# Patient Record
Sex: FEMALE | Race: WHITE | NOT HISPANIC OR LATINO | Employment: FULL TIME | URBAN - METROPOLITAN AREA
[De-identification: names, ages, dates, MRNs, and addresses within clinical notes are randomized per-mention and may not be internally consistent; named-entity substitution may affect disease eponyms.]

---

## 2018-02-15 ENCOUNTER — SURGERY - HEALTHEAST (OUTPATIENT)
Dept: SURGERY | Facility: HOSPITAL | Age: 43
End: 2018-02-15

## 2018-02-15 ENCOUNTER — AMBULATORY - HEALTHEAST (OUTPATIENT)
Dept: SURGERY | Facility: CLINIC | Age: 43
End: 2018-02-15

## 2018-02-15 ENCOUNTER — ANESTHESIA - HEALTHEAST (OUTPATIENT)
Dept: SURGERY | Facility: HOSPITAL | Age: 43
End: 2018-02-15

## 2018-02-15 ASSESSMENT — MIFFLIN-ST. JEOR: SCORE: 1449.88

## 2018-02-16 ASSESSMENT — MIFFLIN-ST. JEOR: SCORE: 1407.69

## 2018-02-22 ENCOUNTER — OFFICE VISIT - HEALTHEAST (OUTPATIENT)
Dept: SURGERY | Facility: CLINIC | Age: 43
End: 2018-02-22

## 2018-02-22 DIAGNOSIS — Z98.890 POST-OPERATIVE STATE: ICD-10-CM

## 2018-02-22 DIAGNOSIS — Z90.49 S/P APPENDECTOMY: ICD-10-CM

## 2018-02-28 ENCOUNTER — COMMUNICATION - HEALTHEAST (OUTPATIENT)
Dept: SURGERY | Facility: CLINIC | Age: 43
End: 2018-02-28

## 2018-03-01 ENCOUNTER — AMBULATORY - HEALTHEAST (OUTPATIENT)
Dept: SURGERY | Facility: CLINIC | Age: 43
End: 2018-03-01

## 2018-03-01 DIAGNOSIS — Z90.49 S/P APPENDECTOMY: ICD-10-CM

## 2018-03-01 RX ORDER — HYDROCODONE BITARTRATE AND ACETAMINOPHEN 5; 325 MG/1; MG/1
1 TABLET ORAL EVERY 4 HOURS PRN
Qty: 10 TABLET | Refills: 0 | Status: SHIPPED | OUTPATIENT
Start: 2018-03-01 | End: 2023-09-19

## 2021-05-25 ENCOUNTER — RECORDS - HEALTHEAST (OUTPATIENT)
Dept: ADMINISTRATIVE | Facility: CLINIC | Age: 46
End: 2021-05-25

## 2021-06-01 VITALS — HEIGHT: 69 IN | WEIGHT: 155.7 LBS | BODY MASS INDEX: 23.06 KG/M2

## 2021-06-16 PROBLEM — K37 APPENDICITIS: Status: ACTIVE | Noted: 2018-02-15

## 2021-06-16 NOTE — ANESTHESIA POSTPROCEDURE EVALUATION
Patient: Ericka Hewitt  APPENDECTOMY, LAPAROSCOPIC  Anesthesia type: general    Patient location: PACU  Last vitals:   Vitals:    02/15/18 1900   BP: 147/69   Pulse: 80   Resp: 28   Temp: 37.2  C (99  F)   SpO2: 100%     Post vital signs: stable  Level of consciousness: awake and responds to simple questions  Post-anesthesia pain: pain controlled  Post-anesthesia nausea and vomiting: no  Pulmonary: unassisted, return to baseline  Cardiovascular: stable and blood pressure at baseline  Hydration: adequate  Anesthetic events: no    QCDR Measures:  ASA# 11 - Enma-op Cardiac Arrest: ASA11B - Patient did NOT experience unanticipated cardiac arrest  ASA# 12 - Enma-op Mortality Rate: ASA12B - Patient did NOT die  ASA# 13 - PACU Re-Intubation Rate: ASA13B - Patient did NOT require a new airway mgmt  ASA# 10 - Composite Anes Safety: ASA10A - No serious adverse event       Additional Notes:

## 2021-06-16 NOTE — PROGRESS NOTES
HPI: Pt is here for follow up of a lap appy.   she is doing well.  Pain is well controlled during the day but she finds herself waking up at night around 3 am due to pain and has difficulty falling back asleep.  No difficulties with the surgical wound/wounds.  she is eating well and denies fever and chills.         /58 (Patient Site: Right Arm, Patient Position: Sitting, Cuff Size: Adult Regular)  Pulse 81  LMP 02/17/2018  SpO2 98%  Breastfeeding? No    EXAM:  GENERAL:Appears well  ABDOMEN:  Soft, +BS  SURGICAL WOUNDS:  Incisions healing well, no enduration or drainage.        Assessment/Plan: .   Refill of 20 tabs of vicodin given to help with pain overnight, tylenol/ibuprofen during the day.    Doing well after surgery and should follow up as needed.      Car Howard, [facility] Department of Surgery

## 2021-06-16 NOTE — ANESTHESIA CARE TRANSFER NOTE
Last vitals:   Vitals:    02/15/18 1840   BP: 165/81   Pulse: 85   Resp: 18   Temp: 36.7  C (98.1  F)   SpO2: 100%     Patient's level of consciousness is drowsy  Spontaneous respirations: yes  Maintains airway independently: yes  Dentition unchanged: yes  Oropharynx: oropharynx clear of all foreign objects    QCDR Measures:  ASA# 20 - Surgical Safety Checklist: WHO surgical safety checklist completed prior to induction  PQRS# 430 - Adult PONV Prevention: 4558F - Pt received => 2 anti-emetic agents (different classes) preop & intraop  ASA# 8 - Peds PONV Prevention: NA - Not pediatric patient, not GA or 2 or more risk factors NOT present  PQRS# 424 - Enma-op Temp Management: 4559F - At least one body temp DOCUMENTED => 35.5C or 95.9F within required timeframe  PQRS# 426 - PACU Transfer Protocol: - Transfer of care checklist used  ASA# 14 - Acute Post-op Pain: ASA14B - Patient did NOT experience pain >= 7 out of 10

## 2021-08-15 ENCOUNTER — HEALTH MAINTENANCE LETTER (OUTPATIENT)
Age: 46
End: 2021-08-15

## 2021-10-11 ENCOUNTER — HEALTH MAINTENANCE LETTER (OUTPATIENT)
Age: 46
End: 2021-10-11

## 2022-09-25 ENCOUNTER — HEALTH MAINTENANCE LETTER (OUTPATIENT)
Age: 47
End: 2022-09-25

## 2023-07-17 ENCOUNTER — APPOINTMENT (OUTPATIENT)
Dept: RADIOLOGY | Facility: HOSPITAL | Age: 48
End: 2023-07-17
Attending: FAMILY MEDICINE
Payer: COMMERCIAL

## 2023-07-17 ENCOUNTER — HOSPITAL ENCOUNTER (EMERGENCY)
Facility: HOSPITAL | Age: 48
Discharge: HOME | End: 2023-07-17
Attending: EMERGENCY MEDICINE | Admitting: EMERGENCY MEDICINE
Payer: COMMERCIAL

## 2023-07-17 VITALS
HEIGHT: 69 IN | OXYGEN SATURATION: 100 % | DIASTOLIC BLOOD PRESSURE: 93 MMHG | HEART RATE: 87 BPM | WEIGHT: 155 LBS | TEMPERATURE: 98.6 F | BODY MASS INDEX: 22.96 KG/M2 | SYSTOLIC BLOOD PRESSURE: 186 MMHG | RESPIRATION RATE: 20 BRPM

## 2023-07-17 DIAGNOSIS — J20.9 ACUTE BRONCHITIS, UNSPECIFIED ORGANISM: ICD-10-CM

## 2023-07-17 LAB
ANION GAP SERPL CALCULATED.3IONS-SCNC: 14 MMOL/L (ref 7–15)
BASE EXCESS BLDV CALC-SCNC: 5.8 MMOL/L
BASOPHILS # BLD AUTO: 0.1 10E3/UL (ref 0–0.2)
BASOPHILS NFR BLD AUTO: 1 %
BUN SERPL-MCNC: 6.4 MG/DL (ref 6–20)
CALCIUM SERPL-MCNC: 9.5 MG/DL (ref 8.6–10)
CHLORIDE SERPL-SCNC: 98 MMOL/L (ref 98–107)
CREAT SERPL-MCNC: 0.64 MG/DL (ref 0.51–0.95)
D DIMER PPP FEU-MCNC: 0.36 UG/ML FEU (ref 0–0.5)
DEPRECATED HCO3 PLAS-SCNC: 27 MMOL/L (ref 22–29)
EOSINOPHIL # BLD AUTO: 0.2 10E3/UL (ref 0–0.7)
EOSINOPHIL NFR BLD AUTO: 2 %
ERYTHROCYTE [DISTWIDTH] IN BLOOD BY AUTOMATED COUNT: 12.8 % (ref 10–15)
GFR SERPL CREATININE-BSD FRML MDRD: >90 ML/MIN/1.73M2
GLUCOSE SERPL-MCNC: 97 MG/DL (ref 70–99)
HCO3 BLDV-SCNC: 30 MMOL/L (ref 24–30)
HCT VFR BLD AUTO: 36.9 % (ref 35–47)
HGB BLD-MCNC: 12.2 G/DL (ref 11.7–15.7)
IMM GRANULOCYTES # BLD: 0.1 10E3/UL
IMM GRANULOCYTES NFR BLD: 1 %
LYMPHOCYTES # BLD AUTO: 5.2 10E3/UL (ref 0.8–5.3)
LYMPHOCYTES NFR BLD AUTO: 47 %
MAGNESIUM SERPL-MCNC: 2.1 MG/DL (ref 1.7–2.3)
MCH RBC QN AUTO: 30 PG (ref 26.5–33)
MCHC RBC AUTO-ENTMCNC: 33.1 G/DL (ref 31.5–36.5)
MCV RBC AUTO: 91 FL (ref 78–100)
MONOCYTES # BLD AUTO: 0.9 10E3/UL (ref 0–1.3)
MONOCYTES NFR BLD AUTO: 8 %
NEUTROPHILS # BLD AUTO: 4.4 10E3/UL (ref 1.6–8.3)
NEUTROPHILS NFR BLD AUTO: 41 %
NRBC # BLD AUTO: 0 10E3/UL
NRBC BLD AUTO-RTO: 0 /100
NT-PROBNP SERPL-MCNC: 81 PG/ML (ref 0–450)
OXYHGB MFR BLDV: 53.7 % (ref 70–75)
PATH REV: NORMAL
PCO2 BLDV: 45 MM HG (ref 35–50)
PH BLDV: 7.43 [PH] (ref 7.35–7.45)
PLAT MORPH BLD: NORMAL
PLATELET # BLD AUTO: 414 10E3/UL (ref 150–450)
PO2 BLDV: 30 MM HG (ref 25–47)
POTASSIUM SERPL-SCNC: 3.3 MMOL/L (ref 3.4–5.3)
RBC # BLD AUTO: 4.06 10E6/UL (ref 3.8–5.2)
RBC MORPH BLD: NORMAL
SAO2 % BLDV: 54.4 % (ref 70–75)
SODIUM SERPL-SCNC: 139 MMOL/L (ref 136–145)
TROPONIN T SERPL HS-MCNC: <6 NG/L
WBC # BLD AUTO: 10.8 10E3/UL (ref 4–11)

## 2023-07-17 PROCEDURE — 85025 COMPLETE CBC W/AUTO DIFF WBC: CPT | Performed by: FAMILY MEDICINE

## 2023-07-17 PROCEDURE — 36415 COLL VENOUS BLD VENIPUNCTURE: CPT | Performed by: FAMILY MEDICINE

## 2023-07-17 PROCEDURE — 94640 AIRWAY INHALATION TREATMENT: CPT

## 2023-07-17 PROCEDURE — 83880 ASSAY OF NATRIURETIC PEPTIDE: CPT | Performed by: FAMILY MEDICINE

## 2023-07-17 PROCEDURE — 71046 X-RAY EXAM CHEST 2 VIEWS: CPT

## 2023-07-17 PROCEDURE — 83735 ASSAY OF MAGNESIUM: CPT | Performed by: FAMILY MEDICINE

## 2023-07-17 PROCEDURE — 99284 EMERGENCY DEPT VISIT MOD MDM: CPT | Mod: 25

## 2023-07-17 PROCEDURE — 82310 ASSAY OF CALCIUM: CPT | Performed by: FAMILY MEDICINE

## 2023-07-17 PROCEDURE — 82805 BLOOD GASES W/O2 SATURATION: CPT | Performed by: FAMILY MEDICINE

## 2023-07-17 PROCEDURE — 82374 ASSAY BLOOD CARBON DIOXIDE: CPT | Performed by: FAMILY MEDICINE

## 2023-07-17 PROCEDURE — 85379 FIBRIN DEGRADATION QUANT: CPT | Performed by: FAMILY MEDICINE

## 2023-07-17 PROCEDURE — 250N000009 HC RX 250: Performed by: FAMILY MEDICINE

## 2023-07-17 PROCEDURE — 84484 ASSAY OF TROPONIN QUANT: CPT | Performed by: FAMILY MEDICINE

## 2023-07-17 RX ORDER — PREDNISONE 10 MG/1
TABLET ORAL
Qty: 22 TABLET | Refills: 0 | Status: SHIPPED | OUTPATIENT
Start: 2023-07-17 | End: 2023-07-17

## 2023-07-17 RX ORDER — PREDNISONE 10 MG/1
TABLET ORAL
Qty: 22 TABLET | Refills: 0 | Status: SHIPPED | OUTPATIENT
Start: 2023-07-17 | End: 2023-07-28

## 2023-07-17 RX ORDER — BENZONATATE 200 MG/1
200 CAPSULE ORAL 3 TIMES DAILY PRN
Qty: 10 CAPSULE | Refills: 0 | Status: SHIPPED | OUTPATIENT
Start: 2023-07-17 | End: 2023-09-19

## 2023-07-17 RX ORDER — IPRATROPIUM BROMIDE AND ALBUTEROL SULFATE 2.5; .5 MG/3ML; MG/3ML
3 SOLUTION RESPIRATORY (INHALATION) ONCE
Status: COMPLETED | OUTPATIENT
Start: 2023-07-17 | End: 2023-07-17

## 2023-07-17 RX ADMIN — IPRATROPIUM BROMIDE AND ALBUTEROL SULFATE 3 ML: .5; 3 SOLUTION RESPIRATORY (INHALATION) at 04:08

## 2023-07-17 ASSESSMENT — ENCOUNTER SYMPTOMS
RHINORRHEA: 1
SHORTNESS OF BREATH: 1
COUGH: 1

## 2023-07-17 ASSESSMENT — ACTIVITIES OF DAILY LIVING (ADL): ADLS_ACUITY_SCORE: 35

## 2023-07-17 NOTE — DISCHARGE INSTRUCTIONS
Albuterol inhaler 2 puffs every 2 hours while awake for 24 hours, then 2 puffs every 3 hours while awake for 24 hours, then 2 puffs every 4 hours as needed

## 2023-07-17 NOTE — ED PROVIDER NOTES
"EMERGENCY DEPARTMENT ENCOUNTER      NAME: Ericka Hewitt  AGE: 47 year old female  YOB: 1975  MRN: [id number]  EVALUATION DATE & TIME: 7/17/2023  3:23 AM    PCP: No primary care provider on file.    ED PROVIDER: Otis Stevenson M.D.    Chief Complaint   Patient presents with     Shortness of Breath       FINAL IMPRESSION:  1. Acute bronchitis, unspecified organism        ED COURSE & MEDICAL DECISION MAKING:    Pertinent Labs & Imaging studies independently interpreted by me. (See chart for details)  3:49 AM patient seen and examined, reviewed prior refill request from January 1 with primary care for albuterol.  Differential diagnosis includes but not limited to bronchitis, pneumonia, pulmonary embolism, pneumothorax, congestive heart failure, COPD, asthma, postnasal drip, reflux, medication reaction.  Patient presents today with cough and some shortness of breath, reports a history of asthma.  Did take prednisone for this, also was using her albuterol about 3 times a day.  She is concerned about \"fluid in the lungs\" but have crackles on exam, no history of heart failure, no lower extremity edema.  On exam she does seem to have some bronchial low pitched wheezes.  Labs and x-ray are ordered.  Consider CT PE study given recent travel and ongoing cough, ddimer ordered.  4:44 AM labs independently interpreted by me demonstrate normal BNP, negative troponin, reassuring venous gas with no hypercarbia.  CBC is without anemia or leukocytosis, mild hypokalemia but otherwise normal basic panel.  Chest x-ray independently interpreted by me does not demonstrate any acute findings.  D-dimer is still pending.  5:50 AM D-dimer is negative.  Patient feels better after nebulizer treatment and is requesting medication for cough for home.  She will be discharged with Tessalon Perles, also increase albuterol frequency and will be started on longer prednisone burst and taper as well.  Otherwise stable for " discharge with no respiratory distress, hypoxia, or indication of infectious etiology of symptoms, negative D-dimer no evidence for pulmonary embolism, no evidence for heart failure based on exam, lab testing, or x-ray.    At the conclusion of the encounter I discussed the results of all of the tests and the disposition. The questions were answered. The patient or family acknowledged understanding and was agreeable with the care plan.     Medical Decision Making    History:    Supplemental history from: Documented in chart, if applicable    External Record(s) reviewed: Documented in chart, if applicable.    Work Up:    Chart documentation includes differential considered and any EKGs or imaging independently interpreted by provider, where specified.    In additional to work up documented, I considered the following work up: Documented in chart, if applicable.    External consultation:    Discussion of management with another provider: Documented in chart, if applicable    Complicating factors:    Care impacted by chronic illness: Chronic Lung Disease    Care affected by social determinants of health: Access to Affordable Health Care and Medication Noncompliance    Disposition considerations: Discharge. I prescribed additional prescription strength medication(s) as charted. I considered admission, but discharged patient after significant clinical improvement.    MEDICATIONS GIVEN IN THE EMERGENCY:  Medications   ipratropium - albuterol 0.5 mg/2.5 mg/3 mL (DUONEB) neb solution 3 mL (3 mLs Nebulization $Given 7/17/23 6471)       NEW PRESCRIPTIONS STARTED AT TODAY'S ER VISIT  New Prescriptions    BENZONATATE (TESSALON) 200 MG CAPSULE    Take 1 capsule (200 mg) by mouth 3 times daily as needed for cough    PREDNISONE (DELTASONE) 10 MG TABLET    Take 4 tablets (40 mg) by mouth daily for 3 days, THEN 2 tablets (20 mg) daily for 3 days, THEN 1 tablet (10 mg) daily for 3 days, THEN 0.5 tablets (5 mg) daily for 2 days.  "      =================================================================    HPI    Patient information was obtained from: patient       Ericka Hewitt is a 47 year old female with a pertinent history of asthma who presents to this ED via walk-in for evaluation of shortness of breath     The patient states \"I think I have water in my lungs\" because she \"just can't breathe anymore\" and \"can't get through the phlegm\". The patient notes that her breathing is \"much worse\" when laying flat. The patient notes a history of asthma and that she has been using her albuterol without relief. The patient also reports a runny nose and congestion.    The patient notes she was in [place] a few weeks ago when she got sick with a virus and cough. The patient reports going to [facility] and getting prescribed prednisone. The patient notes she finished her course of steroids.     Social history: The patient reports alcohol use and denies smoking     REVIEW OF SYSTEMS   Review of Systems   HENT: Positive for congestion and rhinorrhea.    Respiratory: Positive for cough and shortness of breath.    All other systems reviewed and are negative.     All other systems reviewed and negative    PAST MEDICAL HISTORY:  No past medical history on file.    PAST SURGICAL HISTORY:  Past Surgical History:   Procedure Laterality Date     HERNIA REPAIR      Umbilical     [place] LAP,APPENDECTOMY N/A 2/15/2018    Procedure: APPENDECTOMY, LAPAROSCOPIC;  Surgeon: Nisreen Garcia MD;  Location: [facility];  Service: General     TUBAL LIGATION         CURRENT MEDICATIONS:    No current facility-administered medications for this encounter.     Current Outpatient Medications   Medication     benzonatate (TESSALON) 200 MG capsule     predniSONE (DELTASONE) 10 MG tablet     albuterol (PROAIR HFA;PROVENTIL HFA;VENTOLIN HFA) 90 mcg/actuation inhaler     diphenhydrAMINE-acetaminophen (TYLENOL PM EXTRA STRENGTH)  mg Tab     docusate sodium (COLACE) 100 MG " "capsule     fluticasone (FLOVENT HFA) 110 mcg/actuation inhaler     HYDROcodone-acetaminophen 5-325 mg per tablet     ibuprofen (ADVIL,MOTRIN) 200 MG tablet     pediatric multivitamin (FLINTSTONES) Chew chewable tablet       ALLERGIES:  Allergies   Allergen Reactions     Sulfa (Sulfonamide Antibiotics) [Sulfa Antibiotics] Rash     Sulfasalazine Rash       FAMILY HISTORY:  No family history on file.    SOCIAL HISTORY:   Social History     Socioeconomic History     Marital status:    Tobacco Use     Smoking status: Never     Smokeless tobacco: Never   Substance and Sexual Activity     Alcohol use: Yes     Alcohol/week: 6.0 standard drinks of alcohol     Drug use: No       VITALS:  BP (!) 186/93   Pulse 87   Temp 98.6  F (37  C) (Oral)   Resp 20   Ht 1.753 m (5' 9\")   Wt 70.3 kg (155 lb)   LMP 07/10/2023   SpO2 100%   BMI 22.89 kg/m      PHYSICAL EXAM:  Physical Exam  Vitals and nursing note reviewed.   Constitutional:       Appearance: Normal appearance.   HENT:      Head: Normocephalic and atraumatic.      Right Ear: External ear normal.      Left Ear: External ear normal.      Nose: Nose normal.      Mouth/Throat:      Mouth: Mucous membranes are moist.   Eyes:      Extraocular Movements: Extraocular movements intact.      Conjunctiva/sclera: Conjunctivae normal.      Pupils: Pupils are equal, round, and reactive to light.   Cardiovascular:      Rate and Rhythm: Normal rate and regular rhythm.   Pulmonary:      Effort: Pulmonary effort is normal.      Breath sounds: Wheezing present. No rales.      Comments: Trace deep inspiratory wheezes bilaterally   Abdominal:      General: Abdomen is flat. There is no distension.      Palpations: Abdomen is soft.      Tenderness: There is no abdominal tenderness. There is no guarding.   Musculoskeletal:         General: Normal range of motion.      Cervical back: Normal range of motion and neck supple.      Right lower leg: No edema.      Left lower leg: No edema. "   Lymphadenopathy:      Cervical: No cervical adenopathy.   Skin:     General: Skin is warm and dry.   Neurological:      General: No focal deficit present.      Mental Status: She is alert and oriented to person, place, and time. Mental status is at baseline.      Comments: No gross focal neurologic deficits   Psychiatric:         Mood and Affect: Mood normal.         Behavior: Behavior normal.         Thought Content: Thought content normal.          LAB:  All pertinent labs reviewed and interpreted.  Results for orders placed or performed during the hospital encounter of 07/17/23   Chest XR,  PA & LAT    Impression    IMPRESSION: Negative chest.   Basic metabolic panel   Result Value Ref Range    Sodium 139 136 - 145 mmol/L    Potassium 3.3 (L) 3.4 - 5.3 mmol/L    Chloride 98 98 - 107 mmol/L    Carbon Dioxide (CO2) 27 22 - 29 mmol/L    Anion Gap 14 7 - 15 mmol/L    Urea Nitrogen 6.4 6.0 - 20.0 mg/dL    Creatinine 0.64 0.51 - 0.95 mg/dL    Calcium 9.5 8.6 - 10.0 mg/dL    Glucose 97 70 - 99 mg/dL    GFR Estimate >90 >60 mL/min/1.73m2   Result Value Ref Range    Troponin T, High Sensitivity <6 <=14 ng/L   Result Value Ref Range    Magnesium 2.1 1.7 - 2.3 mg/dL   Blood gas venous   Result Value Ref Range    pH Venous 7.43 7.35 - 7.45    pCO2 Venous 45 35 - 50 mm Hg    pO2 Venous 30 25 - 47 mm Hg    Bicarbonate Venous 30 24 - 30 mmol/L    Base Excess/Deficit (+/-) 5.8   mmol/L    Oxyhemoglobin Venous 53.7 (L) 70.0 - 75.0 %    O2 Sat, Venous 54.4 (L) 70.0 - 75.0 %   Nt probnp inpatient (BNP)   Result Value Ref Range    N terminal Pro BNP Inpatient 81 0 - 450 pg/mL   CBC with platelets and differential   Result Value Ref Range    WBC Count 10.8 4.0 - 11.0 10e3/uL    RBC Count 4.06 3.80 - 5.20 10e6/uL    Hemoglobin 12.2 11.7 - 15.7 g/dL    Hematocrit 36.9 35.0 - 47.0 %    MCV 91 78 - 100 fL    MCH 30.0 26.5 - 33.0 pg    MCHC 33.1 31.5 - 36.5 g/dL    RDW 12.8 10.0 - 15.0 %    Platelet Count 414 150 - 450 10e3/uL   D dimer  quantitative   Result Value Ref Range    D-Dimer Quantitative 0.36 0.00 - 0.50 ug/mL FEU       RADIOLOGY:  Reviewed all pertinent imaging. Please see official radiology report.  Chest XR,  PA & LAT   Final Result   IMPRESSION: Negative chest.          I, Felicia Harris, am serving as a scribe to document services personally performed by Dr. Stevenson based on my observation and the provider's statements to me. I, Otis Stevenson MD attest that Felicia Harris is acting in a scribe capacity, has observed my performance of the services and has documented them in accordance with my direction.    Otis Stevenson M.D.  Emergency Medicine  [facility] EMERGENCY DEPARTMENT  [facility] [id number] [phone]  Dept: [phone]     Otis Stevenson MD  07/17/23 0564     Statement Selected

## 2023-07-17 NOTE — ED NOTES
Pt left without getting discharge instruction. Encouraged to wait however insisted to leave. IV removed.

## 2023-07-17 NOTE — ED TRIAGE NOTES
Pt c/o SOB since July 3, has been coughing and c/o CP when coughing. Went to [facility] and was given RX Prednisone. Pt took Mucinex and had Inhaler with no relief. Pt has history of asthma.     Triage Assessment     Row Name 07/17/23 0302       Triage Assessment (Adult)    Airway WDL WDL       Respiratory WDL    Respiratory WDL WDL       Skin Circulation/Temperature WDL    Skin Circulation/Temperature WDL WDL       Cardiac WDL    Cardiac WDL WDL       Peripheral/Neurovascular WDL    Peripheral Neurovascular WDL WDL       Cognitive/Neuro/Behavioral WDL    Cognitive/Neuro/Behavioral WDL WDL

## 2023-09-19 ENCOUNTER — OFFICE VISIT (OUTPATIENT)
Dept: FAMILY MEDICINE | Facility: CLINIC | Age: 48
End: 2023-09-19
Payer: COMMERCIAL

## 2023-09-19 VITALS
WEIGHT: 153.9 LBS | DIASTOLIC BLOOD PRESSURE: 86 MMHG | HEIGHT: 69 IN | RESPIRATION RATE: 20 BRPM | TEMPERATURE: 98.5 F | BODY MASS INDEX: 22.8 KG/M2 | SYSTOLIC BLOOD PRESSURE: 138 MMHG | HEART RATE: 101 BPM | OXYGEN SATURATION: 98 %

## 2023-09-19 DIAGNOSIS — H81.10 BENIGN PAROXYSMAL POSITIONAL VERTIGO, UNSPECIFIED LATERALITY: Primary | ICD-10-CM

## 2023-09-19 PROCEDURE — 99203 OFFICE O/P NEW LOW 30 MIN: CPT | Performed by: FAMILY MEDICINE

## 2023-09-19 RX ORDER — MULTIPLE VITAMINS W/ MINERALS TAB 9MG-400MCG
1 TAB ORAL DAILY
COMMUNITY

## 2023-09-19 ASSESSMENT — PAIN SCALES - GENERAL: PAINLEVEL: NO PAIN (0)

## 2023-09-19 NOTE — PROGRESS NOTES
Assessment & Plan     Benign paroxysmal positional vertigo, unspecified laterality  Acute, patient had several risk factors for possible central process, but overall exam and history reassuring. Discussed different causes of vertigo, ways to manage. Offered over the counter medications (tylenol, aleve for headache), and epley maneuvers as that provides relief to patient.       Review of prior external note(s) from - [facility]where information from [facility] reviewed  I spent a total of 25 minutes on the day of the visit.   Time spent by me doing chart review, history and exam, documentation and further activities per the note       See Patient Instructions    DO BRANDON RILEY [facility] WILLIAM Barnett is a 47 year old, presenting for the following health issues:  Dizziness (All the time.)        9/19/2023     1:16 PM   Additional Questions   Roomed by TABITHA He   Accompanied by N/A       History of Present Illness       Reason for visit:  Vertigo  Symptom onset:  3-7 days ago    She eats 2-3 servings of fruits and vegetables daily.She consumes 2 sweetened beverage(s) daily.She exercises with enough effort to increase her heart rate 30 to 60 minutes per day.  She exercises with enough effort to increase her heart rate 3 or less days per week.   She is taking medications regularly.       Dizziness  Onset/Duration: 3-7 days ago  Description:   Do you feel faint: No  Does it feel like the surroundings (bed, room) are moving: YES  Unsteady/off balance: YES 0- last weekend patient was bed bound.   Have you passed out or fallen: YES  Intensity: severe, 7/10  Progression of Symptoms: improving  Accompanying Signs & Symptoms:  Heart palpitations or chest pain: No  Nausea, vomiting: YES - per patient it is passing, last was on Sunday morning  Weakness or lack of coordination in arms or legs: YES- not anymore though, but did feel that  Vision or speech changes: YES  Numbness or  "tingling: Did but not anymore, stopped Friday  Ringing in ears (Tinnitus): YES- periodically  Hearing Loss: No  History:   Head trauma/concussion history: No  Previous similar symptoms: No  Recent bleeding history: No  Any new medications (BP?): No  Precipitating factors:   Worse with activity: YES  Worse with head movement: YES  Alleviating factors:   Does staying in a fixed position give relief: YES  Therapies tried and outcome: Chiropractic, \"Eulay technique\"    Dizziness gets better with eyes closing. Patient does have chronic migraines. Chiropractic work on Friday morning, patient woke up with symptoms Saturday AM. Symptoms peaked Sunday. Does not regularly take tylenol PM. Reporting her vision is unstable and difficulty focusing her eyes.     She did improve with epley maneuver's.     Reports: Nausea, some slow thinking/brain fog    Denies any: Vomiting now, did have an episode of passing out (felt dizzy, clammy, went to the bathroom and fell)    Review of Systems   Constitutional, HEENT, cardiovascular, pulmonary, gi and gu systems are negative, except as otherwise noted.      Objective    BP (!) 144/91 (BP Location: Right arm, Patient Position: Sitting, Cuff Size: Adult Regular)   Pulse 101   Temp 98.5  F (36.9  C) (Oral)   Resp 20   Ht 1.756 m (5' 9.13\")   Wt 69.8 kg (153 lb 14.4 oz)   LMP 08/05/2023 (Within Days)   SpO2 98%   BMI 22.64 kg/m    Body mass index is 22.64 kg/m .  Physical Exam  Constitutional:       Appearance: Normal appearance. She is normal weight.   Neurological:      Mental Status: She is alert and oriented to person, place, and time.      Cranial Nerves: Cranial nerves 2-12 are intact.      Motor: Motor function is intact.      Coordination: Coordination is intact.      Gait: Gait is intact.      Comments: Normal VOR+  Lateral nystagmus with lateral deviation                           "

## 2023-10-14 ENCOUNTER — HEALTH MAINTENANCE LETTER (OUTPATIENT)
Age: 48
End: 2023-10-14

## 2024-04-23 PROBLEM — Z00.00 ANNUAL PHYSICAL EXAM: Status: ACTIVE | Noted: 2024-04-23

## 2024-06-26 ENCOUNTER — LAB (OUTPATIENT)
Dept: LAB | Facility: CLINIC | Age: 49
End: 2024-06-26
Payer: COMMERCIAL

## 2024-06-26 DIAGNOSIS — Z00.00 ROUTINE GENERAL MEDICAL EXAMINATION AT A HEALTH CARE FACILITY: ICD-10-CM

## 2024-06-26 DIAGNOSIS — Z12.11 ENCOUNTER FOR COLORECTAL CANCER SCREENING: ICD-10-CM

## 2024-06-26 DIAGNOSIS — Z12.31 ENCOUNTER FOR SCREENING MAMMOGRAM FOR BREAST CANCER: ICD-10-CM

## 2024-06-26 DIAGNOSIS — Z12.12 ENCOUNTER FOR COLORECTAL CANCER SCREENING: ICD-10-CM

## 2024-06-26 DIAGNOSIS — Z00.00 ROUTINE GENERAL MEDICAL EXAMINATION AT A HEALTH CARE FACILITY: Primary | ICD-10-CM

## 2024-06-26 LAB
ALBUMIN SERPL BCG-MCNC: 4.5 G/DL (ref 3.5–5.2)
ALP SERPL-CCNC: 52 U/L (ref 40–150)
ALT SERPL W P-5'-P-CCNC: 11 U/L (ref 0–50)
ANION GAP SERPL CALCULATED.3IONS-SCNC: 11 MMOL/L (ref 7–15)
AST SERPL W P-5'-P-CCNC: 19 U/L (ref 0–45)
BASOPHILS # BLD AUTO: 0.1 10E3/UL (ref 0–0.2)
BASOPHILS NFR BLD AUTO: 1 %
BILIRUB SERPL-MCNC: 0.4 MG/DL
BUN SERPL-MCNC: 8.1 MG/DL (ref 6–20)
CALCIUM SERPL-MCNC: 9.2 MG/DL (ref 8.6–10)
CHLORIDE SERPL-SCNC: 103 MMOL/L (ref 98–107)
CHOLEST SERPL-MCNC: 187 MG/DL
CREAT SERPL-MCNC: 0.65 MG/DL (ref 0.51–0.95)
DEPRECATED HCO3 PLAS-SCNC: 23 MMOL/L (ref 22–29)
EGFRCR SERPLBLD CKD-EPI 2021: >90 ML/MIN/1.73M2
EOSINOPHIL # BLD AUTO: 0.3 10E3/UL (ref 0–0.7)
EOSINOPHIL NFR BLD AUTO: 4 %
ERYTHROCYTE [DISTWIDTH] IN BLOOD BY AUTOMATED COUNT: 13.1 % (ref 10–15)
FASTING STATUS PATIENT QL REPORTED: NORMAL
FASTING STATUS PATIENT QL REPORTED: NORMAL
GLUCOSE SERPL-MCNC: 93 MG/DL (ref 70–99)
HCT VFR BLD AUTO: 35.9 % (ref 35–47)
HCV AB SERPL QL IA: NONREACTIVE
HDLC SERPL-MCNC: 78 MG/DL
HGB BLD-MCNC: 12 G/DL (ref 11.7–15.7)
HIV 1+2 AB+HIV1 P24 AG SERPL QL IA: NONREACTIVE
IMM GRANULOCYTES # BLD: 0 10E3/UL
IMM GRANULOCYTES NFR BLD: 0 %
LDLC SERPL CALC-MCNC: 80 MG/DL
LYMPHOCYTES # BLD AUTO: 2 10E3/UL (ref 0.8–5.3)
LYMPHOCYTES NFR BLD AUTO: 29 %
MCH RBC QN AUTO: 30.5 PG (ref 26.5–33)
MCHC RBC AUTO-ENTMCNC: 33.4 G/DL (ref 31.5–36.5)
MCV RBC AUTO: 91 FL (ref 78–100)
MONOCYTES # BLD AUTO: 0.4 10E3/UL (ref 0–1.3)
MONOCYTES NFR BLD AUTO: 6 %
NEUTROPHILS # BLD AUTO: 3.9 10E3/UL (ref 1.6–8.3)
NEUTROPHILS NFR BLD AUTO: 59 %
NONHDLC SERPL-MCNC: 109 MG/DL
PLATELET # BLD AUTO: 294 10E3/UL (ref 150–450)
POTASSIUM SERPL-SCNC: 4.2 MMOL/L (ref 3.4–5.3)
PROT SERPL-MCNC: 7.3 G/DL (ref 6.4–8.3)
RBC # BLD AUTO: 3.94 10E6/UL (ref 3.8–5.2)
SODIUM SERPL-SCNC: 137 MMOL/L (ref 135–145)
TRIGL SERPL-MCNC: 144 MG/DL
WBC # BLD AUTO: 6.6 10E3/UL (ref 4–11)

## 2024-06-26 PROCEDURE — 80061 LIPID PANEL: CPT

## 2024-06-26 PROCEDURE — 85025 COMPLETE CBC W/AUTO DIFF WBC: CPT

## 2024-06-26 PROCEDURE — 36415 COLL VENOUS BLD VENIPUNCTURE: CPT

## 2024-06-26 PROCEDURE — 86803 HEPATITIS C AB TEST: CPT

## 2024-06-26 PROCEDURE — 80053 COMPREHEN METABOLIC PANEL: CPT

## 2024-06-26 PROCEDURE — 87389 HIV-1 AG W/HIV-1&-2 AB AG IA: CPT

## 2024-06-26 RX ORDER — FLUTICASONE PROPIONATE 50 MCG
2 SPRAY, SUSPENSION (ML) NASAL DAILY
COMMUNITY
Start: 2024-06-08

## 2024-08-05 DIAGNOSIS — J45.40 MODERATE PERSISTENT ASTHMA WITHOUT COMPLICATION: ICD-10-CM

## 2024-08-05 RX ORDER — ALBUTEROL SULFATE 90 UG/1
2 AEROSOL, METERED RESPIRATORY (INHALATION) EVERY 4 HOURS PRN
Qty: 18 G | Refills: 0 | Status: SHIPPED | OUTPATIENT
Start: 2024-08-05 | End: 2024-09-04

## 2024-08-05 NOTE — TELEPHONE ENCOUNTER
Pharmacy calling to get a medication refill on medication(s) attached       Disp Refills Start End DAREN   albuterol (PROAIR HFA/PROVENTIL HFA/VENTOLIN HFA) 108 (90 Base) MCG/ACT inhaler 18 g 1 6/27/2024 -- No   Sig - Route: Inhale 2 puffs into the lungs every 4 hours as needed for wheezing or shortness of breath - Inhalation   Sent to pharmacy as: Albuterol Sulfate  (90 Base) MCG/ACT Aerosol Solution (PROAIR HFA/PROVENTIL HFA/VENTOLIN HFA)   Class: E-Prescribe   Notes to Pharmacy: Pharmacy may dispense brand covered by insurance (Proair, or proventil or ventolin or generic albuterol inhaler)   Order: [id number]

## 2024-09-04 DIAGNOSIS — J45.40 MODERATE PERSISTENT ASTHMA WITHOUT COMPLICATION: ICD-10-CM

## 2024-09-04 RX ORDER — ALBUTEROL SULFATE 90 UG/1
2 AEROSOL, METERED RESPIRATORY (INHALATION) EVERY 4 HOURS PRN
Qty: 18 G | Refills: 0 | Status: SHIPPED | OUTPATIENT
Start: 2024-09-04

## 2024-09-04 NOTE — TELEPHONE ENCOUNTER
FAX [facility] Prescription Refill Request    albuterol (PROAIR HFA/PROVENTIL HFA/VENTOLIN HFA) 108 (90 Base) MCG/ACT inhaler 18 g 0 8/5/2024 -- No   Sig - Route: Inhale 2 puffs into the lungs every 4 hours as needed for wheezing or shortness of breath - Inhalation   Sent to pharmacy as: Albuterol Sulfate  (90 Base) MCG/ACT Aerosol Solution (PROAIR HFA/PROVENTIL HFA/VENTOLIN HFA)   Class: E-Prescribe   Notes to Pharmacy: Pharmacy may dispense brand covered by insurance (Proair, or proventil or ventolin or generic albuterol inhaler) Patient needs appointment for future refills     9/4/24 9:53 AM Attempted to reach patient at  left generic voice mail message appt needed

## 2024-12-07 ENCOUNTER — HEALTH MAINTENANCE LETTER (OUTPATIENT)
Age: 49
End: 2024-12-07

## 2024-12-19 ENCOUNTER — PATIENT OUTREACH (OUTPATIENT)
Dept: CARE COORDINATION | Facility: CLINIC | Age: 49
End: 2024-12-19
Payer: COMMERCIAL